# Patient Record
Sex: FEMALE | Race: OTHER | URBAN - METROPOLITAN AREA
[De-identification: names, ages, dates, MRNs, and addresses within clinical notes are randomized per-mention and may not be internally consistent; named-entity substitution may affect disease eponyms.]

---

## 2018-11-28 ENCOUNTER — IMPORTED ENCOUNTER (OUTPATIENT)
Dept: URBAN - METROPOLITAN AREA CLINIC 9 | Facility: CLINIC | Age: 63
End: 2018-11-28

## 2019-03-14 NOTE — PATIENT DISCUSSION
Counseling: Not visually significant to proceed with surgery at this time. I have discussed continuing with current spectacles vs updating. I have offerred the patient a new spectacle prescription to fill if desires. Return to follow up as schedled or sooner if symptoms arise. Last visit 8/1/2016  Last filled 1/5/2017

## 2019-03-14 NOTE — PATIENT DISCUSSION
New Prescription: erythromycin (erythromycin): ointment: 5 mg/gram (0.5 %) a small amount at bedtime on eyelid 03-

## 2019-03-28 NOTE — PATIENT DISCUSSION
Continue: fluorometholone (fluorometholone): drops,suspension: 0.1% 1 drop four times a day into both eyes 03-

## 2019-03-28 NOTE — PATIENT DISCUSSION
Continue: erythromycin (erythromycin): ointment: 5 mg/gram (0.5 %) a small amount at bedtime on eyelid 03-

## 2019-06-10 ENCOUNTER — IMPORTED ENCOUNTER (OUTPATIENT)
Dept: URBAN - METROPOLITAN AREA CLINIC 9 | Facility: CLINIC | Age: 64
End: 2019-06-10

## 2019-09-19 NOTE — PATIENT DISCUSSION
Pinguecula Counseling:  I have explained to the patient at length the diagnosis of pinguecula and its pathophysiology. I recommended the patient adequately protect their eyes from excessive UV light and dry, prudence conditions. The use of artificial tears in dry conditions was encouraged. Return for follow-up as scheduled.

## 2019-09-19 NOTE — PATIENT DISCUSSION
New Prescription: FML Liquifilm (fluorometholone): drops,suspension: 0.1% 1 drop four times a day into both eyes 09-

## 2019-10-24 NOTE — PATIENT DISCUSSION
Stopped Today: FML Liquifilm (fluorometholone): drops,suspension: 0.1% 1 drop four times a day into both eyes 09-

## 2020-07-13 ENCOUNTER — IMPORTED ENCOUNTER (OUTPATIENT)
Dept: URBAN - METROPOLITAN AREA CLINIC 9 | Facility: CLINIC | Age: 65
End: 2020-07-13

## 2021-06-28 ENCOUNTER — IMPORTED ENCOUNTER (OUTPATIENT)
Dept: URBAN - METROPOLITAN AREA CLINIC 9 | Facility: CLINIC | Age: 66
End: 2021-06-28

## 2021-06-28 PROBLEM — H35.3131: Noted: 2021-06-28

## 2021-06-28 PROBLEM — H26.493: Noted: 2021-06-28

## 2021-06-28 PROBLEM — H04.123: Noted: 2021-06-28

## 2021-06-28 PROBLEM — E11.9: Noted: 2021-06-28

## 2021-10-18 ASSESSMENT — VISUAL ACUITY
OD_SC: 20/25 SN
OS_CC: 20/20 - SN
OS_SC: 20/20 SN
OD_SC: 20/20 SN
OS_SC: 20/25 SN
OS_SC: 20/30 +2 SN
OD_CC: 20/20 SN
OD_SC: 20/30 SN
OD_SC: 20/30 SN
OS_SC: 20/40 - SN

## 2021-10-18 ASSESSMENT — TONOMETRY
OS_IOP_MMHG: 12
OD_IOP_MMHG: 14
OS_IOP_MMHG: 15
OD_IOP_MMHG: 12
OS_IOP_MMHG: 14
OD_IOP_MMHG: 12
OD_IOP_MMHG: 14
OS_IOP_MMHG: 13

## 2022-08-09 ENCOUNTER — ESTABLISHED PATIENT (OUTPATIENT)
Dept: URBAN - METROPOLITAN AREA CLINIC 17 | Facility: CLINIC | Age: 67
End: 2022-08-09

## 2022-08-09 DIAGNOSIS — H35.3131: ICD-10-CM

## 2022-08-09 DIAGNOSIS — E11.9: ICD-10-CM

## 2022-08-09 PROCEDURE — 92014 COMPRE OPH EXAM EST PT 1/>: CPT

## 2022-08-09 PROCEDURE — 92134 CPTRZ OPH DX IMG PST SGM RTA: CPT

## 2022-08-09 ASSESSMENT — TONOMETRY
OS_IOP_MMHG: 14
OD_IOP_MMHG: 15

## 2022-08-09 ASSESSMENT — VISUAL ACUITY
OD_SC: 20/25
OS_SC: 20/25-1

## 2023-08-09 ENCOUNTER — ESTABLISHED PATIENT (OUTPATIENT)
Dept: URBAN - METROPOLITAN AREA CLINIC 17 | Facility: CLINIC | Age: 68
End: 2023-08-09

## 2023-08-09 DIAGNOSIS — H26.493: ICD-10-CM

## 2023-08-09 DIAGNOSIS — E11.3291: ICD-10-CM

## 2023-08-09 DIAGNOSIS — H35.3131: ICD-10-CM

## 2023-08-09 PROCEDURE — 92134 CPTRZ OPH DX IMG PST SGM RTA: CPT

## 2023-08-09 PROCEDURE — 99214 OFFICE O/P EST MOD 30 MIN: CPT

## 2023-08-09 PROCEDURE — 92015 DETERMINE REFRACTIVE STATE: CPT

## 2023-08-09 ASSESSMENT — KERATOMETRY
OS_K1POWER_DIOPTERS: 45
OD_AXISANGLE2_DEGREES: 84
OD_AXISANGLE_DEGREES: 174
OS_K2POWER_DIOPTERS: 44.25
OD_K1POWER_DIOPTERS: 44.25
OD_K2POWER_DIOPTERS: 45
OS_AXISANGLE_DEGREES: 23
OS_AXISANGLE2_DEGREES: 113

## 2023-08-09 ASSESSMENT — VISUAL ACUITY
OS_SC: 20/20-1
OU_SC: 20/20-1
OD_SC: 20/25+1

## 2023-08-09 ASSESSMENT — TONOMETRY
OD_IOP_MMHG: 9
OS_IOP_MMHG: 8

## 2024-09-09 ENCOUNTER — COMPREHENSIVE EXAM (OUTPATIENT)
Dept: URBAN - METROPOLITAN AREA CLINIC 17 | Facility: CLINIC | Age: 69
End: 2024-09-09

## 2024-09-09 DIAGNOSIS — E11.9: ICD-10-CM

## 2024-09-09 DIAGNOSIS — H35.3131: ICD-10-CM

## 2024-09-09 DIAGNOSIS — H04.123: ICD-10-CM

## 2024-09-09 DIAGNOSIS — H26.493: ICD-10-CM

## 2024-09-09 PROCEDURE — 92134 CPTRZ OPH DX IMG PST SGM RTA: CPT

## 2024-09-09 PROCEDURE — 92014 COMPRE OPH EXAM EST PT 1/>: CPT

## 2025-02-05 ENCOUNTER — EMERGENCY VISIT (OUTPATIENT)
Age: 70
End: 2025-02-05

## 2025-02-05 DIAGNOSIS — H04.123: ICD-10-CM

## 2025-02-05 PROCEDURE — 99213 OFFICE O/P EST LOW 20 MIN: CPT

## 2025-02-05 RX ORDER — TOBRAMYCIN AND DEXAMETHASONE 3; 1 MG/G; MG/G: 1/2 OINTMENT OPHTHALMIC
